# Patient Record
Sex: FEMALE | Race: WHITE
[De-identification: names, ages, dates, MRNs, and addresses within clinical notes are randomized per-mention and may not be internally consistent; named-entity substitution may affect disease eponyms.]

---

## 2017-08-09 ENCOUNTER — HOSPITAL ENCOUNTER (OUTPATIENT)
Dept: HOSPITAL 8 - RAD | Age: 67
Discharge: HOME | End: 2017-08-09
Attending: PHYSICAL MEDICINE & REHABILITATION
Payer: MEDICARE

## 2017-08-09 DIAGNOSIS — M43.22: ICD-10-CM

## 2017-08-09 DIAGNOSIS — M48.03: ICD-10-CM

## 2017-08-09 DIAGNOSIS — M51.16: ICD-10-CM

## 2017-08-09 DIAGNOSIS — M50.13: Primary | ICD-10-CM

## 2017-08-09 DIAGNOSIS — M41.86: ICD-10-CM

## 2017-08-09 PROCEDURE — 72148 MRI LUMBAR SPINE W/O DYE: CPT

## 2017-08-09 PROCEDURE — 72141 MRI NECK SPINE W/O DYE: CPT

## 2017-08-09 PROCEDURE — 99157 MOD SED OTHER PHYS/QHP EA: CPT

## 2017-08-09 PROCEDURE — 99156 MOD SED OTH PHYS/QHP 5/>YRS: CPT

## 2020-05-08 ENCOUNTER — HOSPITAL ENCOUNTER (EMERGENCY)
Dept: HOSPITAL 8 - ED | Age: 70
Discharge: HOME | End: 2020-05-08
Payer: MEDICARE

## 2020-05-08 VITALS — WEIGHT: 138.01 LBS | HEIGHT: 63 IN | BODY MASS INDEX: 24.45 KG/M2

## 2020-05-08 VITALS — SYSTOLIC BLOOD PRESSURE: 162 MMHG | DIASTOLIC BLOOD PRESSURE: 74 MMHG

## 2020-05-08 DIAGNOSIS — I25.2: ICD-10-CM

## 2020-05-08 DIAGNOSIS — Z86.73: ICD-10-CM

## 2020-05-08 DIAGNOSIS — I10: ICD-10-CM

## 2020-05-08 DIAGNOSIS — R94.31: ICD-10-CM

## 2020-05-08 DIAGNOSIS — G89.29: ICD-10-CM

## 2020-05-08 DIAGNOSIS — M54.16: Primary | ICD-10-CM

## 2020-05-08 DIAGNOSIS — M54.6: ICD-10-CM

## 2020-05-08 PROCEDURE — 72131 CT LUMBAR SPINE W/O DYE: CPT

## 2020-05-08 PROCEDURE — 96372 THER/PROPH/DIAG INJ SC/IM: CPT

## 2020-05-08 PROCEDURE — 93005 ELECTROCARDIOGRAM TRACING: CPT

## 2020-05-08 PROCEDURE — 72128 CT CHEST SPINE W/O DYE: CPT

## 2020-05-08 PROCEDURE — 99285 EMERGENCY DEPT VISIT HI MDM: CPT
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
